# Patient Record
Sex: FEMALE | Race: WHITE
[De-identification: names, ages, dates, MRNs, and addresses within clinical notes are randomized per-mention and may not be internally consistent; named-entity substitution may affect disease eponyms.]

---

## 2017-01-24 ENCOUNTER — HOSPITAL ENCOUNTER (OUTPATIENT)
Age: 82
Discharge: HOME | End: 2017-01-24
Payer: COMMERCIAL

## 2017-12-31 ENCOUNTER — HOSPITAL ENCOUNTER (EMERGENCY)
Dept: HOSPITAL 76 - ED | Age: 82
Discharge: HOME | End: 2017-12-31
Payer: COMMERCIAL

## 2017-12-31 VITALS — SYSTOLIC BLOOD PRESSURE: 136 MMHG | DIASTOLIC BLOOD PRESSURE: 65 MMHG
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
## 2017-12-31 DIAGNOSIS — I10: ICD-10-CM
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
## 2017-12-31 DIAGNOSIS — S52.501A: Primary | ICD-10-CM

## 2017-12-31 PROCEDURE — 99284 EMERGENCY DEPT VISIT MOD MDM: CPT

## 2017-12-31 PROCEDURE — 29105 APPLICATION LONG ARM SPLINT: CPT

## 2017-12-31 PROCEDURE — 99283 EMERGENCY DEPT VISIT LOW MDM: CPT

## 2017-12-31 NOTE — ED PHYSICIAN DOCUMENTATION
PD HPI UPPER EXT INJURY





- Stated complaint


Stated Complaint: GLF/ARM INJURY





- Chief complaint


Chief Complaint: Trauma Ext





- History obtained from


History obtained from: Patient, Family





- History of Present Illness


Location: Right, Wrist


Type of injury: Fall


Where injury occurred: Home


Timing - onset: Yesterday


Timing - details: Abrupt onset


Improved by: Rest, Immobilization


Worsened by: Moving, Palpating


Associated symptoms: Swelling, Discolored.  No: Weakness, Numbness


Contributing factors: No: Anticoagulated, Prior ortho surgery


Recently seen: Not recently seen





- Additonal information


Additional information: 





patient is an 82 year old female presenting to the emergency department for 

wrist pain.  Patient states that she slipped last night and fell.  Patient put 

her arm out to break her fall and she hurt her wrist.  Patient denies any head 

trauma, loc or pain anywhere else.  





Review of Systems


Constitutional: reports: Reviewed and negative


Eyes: reports: Reviewed and negative


Ears: reports: Reviewed and negative


Nose: reports: Reviewed and negative


Throat: reports: Reviewed and negative


Cardiac: denies: Chest pain / pressure, Palpitations


Respiratory: denies: Dyspnea, Cough, Wheezing


GI: denies: Nausea, Vomiting


: reports: Reviewed and negative


Skin: denies: Rash, Lesions, Abrasion (s)


Musculoskeletal: reports: Extremity pain, Joint pain, Joint swelling


Neurologic: denies: Generalized weakness, Focal weakness, Numbness





PD PAST MEDICAL HISTORY





- Past Medical History


Cardiovascular: Hypertension


Respiratory: COPD


Neuro: Other


Psych: Depression


Other Past Medical History: memory loss





- Past Surgical History


General: Cholecystectomy


HEENT: Cataracts





- Present Medications


Home Medications: 


 Ambulatory Orders











 Medication  Instructions  Recorded  Confirmed


 


Oxycodone HCl/Acetaminophen 1 - 2 each PO Q6H PRN #7 tablet 12/31/17 





[Percocet 5-325 mg Tablet]   














- Allergies


Allergies/Adverse Reactions: 


 Allergies











Allergy/AdvReac Type Severity Reaction Status Date / Time


 


No Known Drug Allergies Allergy   Verified 12/31/17 06:29














- Social History


Does the pt smoke?: No


Smoking Status: Never smoker


Does the pt drink ETOH?: Yes


ETOH Use: Wine


Does the pt have substance abuse?: No





PD ED PE NORMAL





- Vitals


Vital signs reviewed: Yes





- General


General: Alert and oriented X 3, Well developed/nourished





- HEENT


HEENT: Atraumatic, PERRL





- Neck


Neck: No bony TTP





- Cardiac


Cardiac: RRR, No murmur





- Respiratory


Respiratory: No respiratory distress





- Abdomen


Abdomen: Non distended





- Neuro


Neuro: Alert and oriented X 3, No sensory deficit, Normal speech


Eye Opening: Spontaneous


Motor: Obeys Commands


Verbal: Oriented


GCS Score: 15





PD ED PE EXPANDED





- Extremities


Extremities: Right wrist (tenderness, ecchymosis and swelling of right wrist)





Results





- Vitals


Vitals: 


 Vital Signs - 24 hr











  12/31/17





  06:29


 


Temperature 36.5 C


 


Heart Rate 76


 


Respiratory 16





Rate 


 


Blood Pressure 136/65 H


 


O2 Saturation 97








 Oxygen











O2 Source                      Room air

















- Rads (name of study)


  ** right wrist


Radiology: Final report received (minimally displaced distal radial fx)





Procedures





- Splint (location)


  ** right wrist


Splint applied by: Tech


Type of splint: Sugar tong


Other: Patient tolerated well, No complications, Neurovascular intact, Good 

alignment





PD MEDICAL DECISION MAKING





- ED course


Complexity details: reviewed old records, reviewed results, re-evaluated patient

, considered differential, d/w patient, d/w family


ED course: 





Patient was seen and examined at bedside.  patient was sent for imaging.  when 

patient returned the results were reviewed.  there was a distal radial 

fracture.  Patient was placed in a sugar tong splint.  Patient tolerated it 

well and was stable for discharge with outpatient orthopedic follow up.  





Departure





- Departure


Disposition: 01 Home, Self Care


Clinical Impression: 


 Wrist fracture, right





Condition: Good


Instructions:  Distal Radius Fx


Follow-Up: 


Mendez Low MD [Provider Admit Priv/Credential] - Within 3 Days


Prescriptions: 


Oxycodone HCl/Acetaminophen [Percocet 5-325 mg Tablet] 1 - 2 each PO Q6H PRN #7 

tablet


 PRN Reason: pain


Comments: 


Your symptoms today are being caused by a wrist fracture.  You were placed in a 

splint but you will need to follow up with the orthopedic doctor for further 

care.  You should keep your wrist elevated and ice the wrist at least 4 times a 

day.  You should take tylenol for pain.  You should return to the emergency 

department for numbness, uncontrollable pain, new or worsening symptoms.  


Discharge Date/Time: 12/31/17 07:52

## 2017-12-31 NOTE — XRAY PRELIMINARY REPORT
Accession: [id number]

Exam: XR WRIST 3 VIEW RT

 

IMPRESSION: 

1. Fracture of the distal radial metaphysis extending to the distal radial articular surface. 

2. No significant angulation.

 

[facility]

 

SITE ID: [id number]

## 2017-12-31 NOTE — XRAY REPORT
EXAM:

RIGHT WRIST RADIOGRAPHY

 

EXAM DATE: 12/31/2017 06:43 AM.

 

CLINICAL HISTORY: Fall, wrist pain.

 

COMPARISON: None.

 

TECHNIQUE: 4 views.

 

FINDINGS: 

Bones: Osteopenia. Fracture of the distal radial metaphysis extending to the distal radial articular 
surface. No significant angulation. Metal plate and screws in the distal ulna.

 

Joints: No dislocation seen. 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

 

Soft Tissues: Soft tissue swelling.

 

IMPRESSION: 

1. Fracture of the distal radial metaphysis extending to the distal radial articular surface. 

2. No significant angulation.

 

[facility]

Referring Provider Line: [phone]

 

SITE ID: [id number]

## 2020-05-13 ENCOUNTER — HOSPITAL ENCOUNTER (EMERGENCY)
Dept: HOSPITAL 76 - ED | Age: 85
Discharge: HOME | End: 2020-05-13
Payer: MEDICARE

## 2020-05-13 VITALS — DIASTOLIC BLOOD PRESSURE: 68 MMHG | SYSTOLIC BLOOD PRESSURE: 105 MMHG

## 2020-05-13 DIAGNOSIS — I10: ICD-10-CM

## 2020-05-13 DIAGNOSIS — J44.9: Primary | ICD-10-CM

## 2020-05-13 LAB
ANION GAP SERPL CALCULATED.4IONS-SCNC: 8 MMOL/L (ref 6–13)
BASOPHILS NFR BLD AUTO: 0.1 10^3/UL (ref 0–0.1)
BASOPHILS NFR BLD AUTO: 0.6 %
BUN SERPL-MCNC: 19 MG/DL (ref 6–20)
CALCIUM UR-MCNC: 9.7 MG/DL (ref 8.5–10.3)
CHLORIDE SERPL-SCNC: 104 MMOL/L (ref 101–111)
CO2 SERPL-SCNC: 25 MMOL/L (ref 21–32)
CREAT SERPLBLD-SCNC: 0.8 MG/DL (ref 0.4–1)
EOSINOPHIL # BLD AUTO: 0.2 10^3/UL (ref 0–0.7)
EOSINOPHIL NFR BLD AUTO: 1.8 %
ERYTHROCYTE [DISTWIDTH] IN BLOOD BY AUTOMATED COUNT: 13.3 % (ref 12–15)
GLUCOSE SERPL-MCNC: 116 MG/DL (ref 70–100)
HGB UR QL STRIP: 13.3 G/DL (ref 12–16)
LYMPHOCYTES # SPEC AUTO: 1.7 10^3/UL (ref 1.5–3.5)
LYMPHOCYTES NFR BLD AUTO: 20 %
MCH RBC QN AUTO: 30.3 PG (ref 27–31)
MCHC RBC AUTO-ENTMCNC: 31.9 G/DL (ref 32–36)
MCV RBC AUTO: 95 FL (ref 81–99)
MONOCYTES # BLD AUTO: 1.7 10^3/UL (ref 0–1)
MONOCYTES NFR BLD AUTO: 19.4 %
NEUTROPHILS # BLD AUTO: 4.9 10^3/UL (ref 1.5–6.6)
NEUTROPHILS # SNV AUTO: 8.5 X10^3/UL (ref 4.8–10.8)
NEUTROPHILS NFR BLD AUTO: 57.8 %
PDW BLD AUTO: 10 FL (ref 7.9–10.8)
PLAT MORPH BLD: (no result)
PLATELET # BLD: 236 10^3/UL (ref 130–450)
PLATELET BLD QL SMEAR: (no result)
RBC MAR: 4.39 10^6/UL (ref 4.2–5.4)
RBC MORPH BLD: (no result)
SODIUM SERPLBLD-SCNC: 137 MMOL/L (ref 135–145)

## 2020-05-13 PROCEDURE — 36415 COLL VENOUS BLD VENIPUNCTURE: CPT

## 2020-05-13 PROCEDURE — 94664 DEMO&/EVAL PT USE INHALER: CPT

## 2020-05-13 PROCEDURE — 85025 COMPLETE CBC W/AUTO DIFF WBC: CPT

## 2020-05-13 PROCEDURE — 71045 X-RAY EXAM CHEST 1 VIEW: CPT

## 2020-05-13 PROCEDURE — 94640 AIRWAY INHALATION TREATMENT: CPT

## 2020-05-13 PROCEDURE — 80048 BASIC METABOLIC PNL TOTAL CA: CPT

## 2020-05-13 PROCEDURE — 99284 EMERGENCY DEPT VISIT MOD MDM: CPT

## 2020-05-13 RX ADMIN — ALBUTEROL SULFATE STA MG: 2.5 SOLUTION RESPIRATORY (INHALATION) at 15:53

## 2020-05-13 RX ADMIN — IPRATROPIUM BROMIDE AND ALBUTEROL SULFATE STA ML: 2.5; .5 SOLUTION RESPIRATORY (INHALATION) at 14:39

## 2020-05-13 NOTE — XRAY REPORT
Reason:  cough, COPD

Procedure Date:  05/13/2020   

Accession Number:  [id number] / [id number]                    

Procedure:  XR  - Chest 1 View X-Ray CPT Code:  25909

 

***Final Report***

 

 

FULL RESULT:

 

 

EXAM:

CHEST RADIOGRAPHY

 

EXAM DATE: 5/13/2020 03:07 PM.

 

CLINICAL HISTORY: Cough, COPD.

 

COMPARISON: XR RIBS UNILAT W/ PA CHEST MIN 3 VIEWS 07/26/2008 1:43 PM.

 

TECHNIQUE: 1 view.

 

FINDINGS:

Lungs/Pleura: No focal opacities evident. No pleural effusion. No 

pneumothorax.

 

Mediastinum: Within exam limitations, the cardiomediastinal contour is 

normal.

 

Other: None.

IMPRESSION: No acute consolidation is identified.

 

[facility]

## 2020-05-13 NOTE — ED PHYSICIAN DOCUMENTATION
History of Present Illness





- Stated complaint


Stated Complaint: SOA





- Chief complaint


Chief Complaint: Resp





- History obtained from


History obtained from: Patient





- History of Present Illness


Timing: Last night


Pain level max: 0


Pain level now: 0





- Additonal information


Additional information: 





84-year-old female with a history of emphysema presents to the emergency 

department with increasing dyspnea since last night.  No cough.  No fever.  No 

chest pain.  No abdominal pain.  No nausea.  No vomiting.  She states that she 

has an inhaler at home but does not  No recent travel.  No recent surgeries.  

Worse with exertion, better with rest.use it.  States she has not used it in 

several months.





Review of Systems


Ten Systems: 10 systems reviewed and negative


Constitutional: denies: Fever, Chills


Eyes: denies: Decreased vision


Ears: denies: Ear pain


Nose: denies: Rhinorrhea / runny nose, Congestion


Throat: denies: Sore throat


Cardiac: denies: Chest pain / pressure, Palpitations


Respiratory: reports: Dyspnea, Wheezing.  denies: Cough, Hemoptysis


GI: denies: Abdominal Pain, Nausea, Vomiting, Diarrhea


: denies: Dysuria


Skin: denies: Rash


Musculoskeletal: denies: Neck pain, Back pain


Neurologic: denies: Headache





PD PAST MEDICAL HISTORY





- Past Medical History


Cardiovascular: Hypertension


Respiratory: COPD


Psych: Depression





- Past Surgical History


General: Cholecystectomy


HEENT: Cataracts





- Present Medications


Home Medications: 


                                Ambulatory Orders











 Medication  Instructions  Recorded  Confirmed


 


Oxycodone HCl/Acetaminophen 1 - 2 each PO Q6H PRN #7 tablet 12/31/17 





[Percocet 5-325 mg Tablet]   


 


Albuterol Sulf [Ventolin Hfa 1 - 2 puffs INH Q4HR PRN #1 inhaler 05/13/20 





Inhaler]   


 


predniSONE [Prednisone] 20 mg PO DAILY #5 tablet 05/13/20 














- Allergies


Allergies/Adverse Reactions: 


                                    Allergies











Allergy/AdvReac Type Severity Reaction Status Date / Time


 


No Known Drug Allergies Allergy   Verified 12/31/17 06:29














- Social History


Does the pt smoke?: No


Smoking Status: Never smoker


Does the pt drink ETOH?: Yes


Does the pt have substance abuse?: No





PD ED PE NORMAL





- Vitals


Vital signs reviewed: Yes





- General


General: Alert and oriented X 3, No acute distress, Well developed/nourished





- HEENT


HEENT: PERRL, Moist mucous membranes





- Neck


Neck: Supple, no meningeal sign





- Cardiac


Cardiac: RRR, Strong equal pulses





- Respiratory


Respiratory: Other (tight breath sounds with wheezing.)





- Abdomen


Abdomen: Soft, Non tender, Non distended





- Derm


Derm: Warm and dry, No rash





- Extremities


Extremities: No edema, No calf tenderness / cord





- Neuro


Neuro: Alert and oriented X 3





- Psych


Psych: Normal mood, Normal affect





Results





- Vitals


Vitals: 


                               Vital Signs - 24 hr











  05/13/20 05/13/20 05/13/20





  14:14 14:39 14:46


 


Temperature 36.7 C  


 


Heart Rate 100 90 89


 


Respiratory 28 H 22 13





Rate   


 


Blood Pressure 151/85 H  168/60 H


 


O2 Saturation 94  97














  05/13/20 05/13/20 05/13/20





  15:16 15:53 16:00


 


Temperature   


 


Heart Rate 87 81 85


 


Respiratory 16 12 16





Rate   


 


Blood Pressure 116/61  122/65


 


O2 Saturation 98  98








                                     Oxygen











O2 Source                      Room air

















- Labs


Labs: 


                                Laboratory Tests











  05/13/20 05/13/20





  14:42 14:42


 


WBC  8.5 


 


RBC  4.39 


 


Hgb  13.3 


 


Hct  41.7 


 


MCV  95.0 


 


MCH  30.3 


 


MCHC  31.9 L 


 


RDW  13.3 


 


Plt Count  236 


 


MPV  10.0 


 


Neut # (Auto)  4.9 


 


Lymph # (Auto)  1.7 


 


Mono # (Auto)  1.7 H 


 


Eos # (Auto)  0.2 


 


Baso # (Auto)  0.1 


 


Absolute Nucleated RBC  0.00 


 


Nucleated RBC %  0.0 


 


Manual Slide Review  Indicated 


 


WBC Morphology  NORMAL APPEARANCE 


 


Platelet Estimate  NORMAL (130-450,000) 


 


Platelet Morphology  NORMAL APPEARANCE 


 


RBC Morph Micro Appear  NORMAL APPEARANCE 


 


Sodium   137


 


Potassium   3.7


 


Chloride   104


 


Carbon Dioxide   25


 


Anion Gap   8.0


 


BUN   19


 


Creatinine   0.8


 


Estimated GFR (MDRD)   69 L


 


Glucose   116 H


 


Calcium   9.7














- Rads (name of study)


  ** cxr


Radiology: Prelim report reviewed, EMP read contemporaneously, See rad report 

(no acute disease)





PD MEDICAL DECISION MAKING





- ED course


Complexity details: reviewed results, re-evaluated patient, considered 

differential, d/w patient


ED course: 





Patient feels much better after nebulizer treatments and steroids.  Appears to 

have a flare of her underlying emphysema.  We will prescribe a new inhaler for 

her as well as a short course of low-dose steroids.  No fevers.  No pneumonia.  

No evidence of acute coronary syndrome or pulmonary embolus.  Patient counseled 

regarding signs and symptoms for which I believe and urgent re-evaluation would 

be necessary. Patient with good understanding of and agreement to plan and is 

comfortable going home at this time





This document was made in part using voice recognition software. While efforts 

are made to proofread this document, sound alike and grammatical errors may 

occur.





Departure





- Departure


Disposition: 01 Home, Self Care


Clinical Impression: 


 Moderate COPD (chronic obstructive pulmonary disease)





Condition: Good


Instructions:  ED COPD Flare


Follow-Up: 


your,doctor in 1 week [Other]


Prescriptions: 


Albuterol Sulf [Ventolin Hfa Inhaler] 1 - 2 puffs INH Q4HR PRN #1 inhaler


 PRN Reason: Shortness Of Air/Wheezing


predniSONE [Prednisone] 20 mg PO DAILY #5 tablet


Comments: 


Use the inhaler as prescribed.  Return if you worsen.  Follow-up with your 

doctor for further care.

## 2020-11-10 ENCOUNTER — HOSPITAL ENCOUNTER (OUTPATIENT)
Dept: HOSPITAL 76 - EMS | Age: 85
End: 2020-11-10
Attending: SURGERY
Payer: COMMERCIAL

## 2020-11-10 DIAGNOSIS — R53.1: Primary | ICD-10-CM

## 2021-12-16 ENCOUNTER — HOSPITAL ENCOUNTER (EMERGENCY)
Dept: HOSPITAL 76 - ED | Age: 86
Discharge: HOME | End: 2021-12-16
Payer: MEDICARE

## 2021-12-16 ENCOUNTER — HOSPITAL ENCOUNTER (OUTPATIENT)
Dept: HOSPITAL 76 - EMS | Age: 86
Discharge: ANOTHER HOSPITAL | End: 2021-12-16
Payer: MEDICARE

## 2021-12-16 VITALS — DIASTOLIC BLOOD PRESSURE: 99 MMHG | SYSTOLIC BLOOD PRESSURE: 165 MMHG

## 2021-12-16 DIAGNOSIS — F03.90: ICD-10-CM
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
## 2021-12-16 DIAGNOSIS — R07.81: ICD-10-CM

## 2021-12-16 DIAGNOSIS — Z04.3: Primary | ICD-10-CM

## 2021-12-16 DIAGNOSIS — I10: ICD-10-CM

## 2021-12-16 DIAGNOSIS — J44.9: ICD-10-CM

## 2021-12-16 DIAGNOSIS — S22.060A: Primary | ICD-10-CM

## 2021-12-16 PROCEDURE — 71250 CT THORAX DX C-: CPT

## 2021-12-16 PROCEDURE — 99284 EMERGENCY DEPT VISIT MOD MDM: CPT

## 2021-12-16 PROCEDURE — 94640 AIRWAY INHALATION TREATMENT: CPT

## 2021-12-16 NOTE — ED PHYSICIAN DOCUMENTATION
PD HPI Fall





- Stated complaint


Stated Complaint: GLF/RIB PX





- History obtained from


History obtained from: Patient





- History of Present Illness


Mechanism of injury: Lost balance


Fall distance: Standing position


Where injury occurred: Home ( did not see the fall, but came into room to

find her on floor. Compalined of pain upper back. No headache. She was 

conversant when he came in.)


Timing - onset: How many minutes ago (30), Today


Injury(ies) location: Chest (some chest pain posterolaterally on right.), Back. 

No: Head, Face, Neck


Quality of pain: Pain, Aching


Associated symptoms: No: LOC, AMS, Weakness, Paresthesias


Worsens with: Movement


Contributing factors: No: Anticoagulated, Intoxicated


Similar symptoms before: Diagnosis (prior fall several years or so ago with 

several rib fractures bilaterally and PTX.)





Review of Systems


Constitutional: denies: Fever, Chills


Nose: denies: Rhinorrhea / runny nose, Congestion


Throat: denies: Sore throat


Respiratory: denies: Cough


GI: denies: Abdominal Pain, Nausea, Vomiting, Diarrhea


Skin: denies: Abrasion (s), Laceration (s)


Musculoskeletal: reports: Back pain


Neurologic: denies: Focal weakness, Numbness, Headache, Head injury





PD PAST MEDICAL HISTORY





- Past Medical History


Cardiovascular: Hypertension


Respiratory: COPD


Neuro: Dementia


Endocrine/Autoimmune: None


GI: None


GYN: None


: None


HEENT: None


Psych: Depression


Musculoskeletal: None


Derm: None





- Past Surgical History


Past Surgical History: Yes


General: Cholecystectomy


HEENT: Cataracts





- Present Medications


Home Medications: 


                                Ambulatory Orders











 Medication  Instructions  Recorded  Confirmed


 


Albuterol Sulf [Ventolin Hfa 1 - 2 puffs INH Q4HR PRN #1 inhaler 05/13/20 





Inhaler]   


 


Albuterol Sulfate [Proair Hfa  06/12/20 





Inhaler]   


 


Alendronate [Fosamax] 70 mg PO ONCE 06/12/20 06/12/20


 


Amlodipine Besylate [Norvasc] mg PO 06/12/20 


 


Aspirin 81 mg PO 06/12/20 


 


Atorvastatin [Lipitor] mg 06/12/20 


 


Fluticasone 44 Mcg [Flovent] 1 puffs INH BID 06/12/20 06/12/20


 


Fluticasone 44 Mcg [Flovent] 1 puffs INH BID #1 inhaler 06/12/20 


 


Lisinopril [Zestril] mg PO 06/12/20 


 


Omeprazole mg PO 06/12/20 


 


Sertraline [Zoloft] mg PO DAILY 06/12/20 


 


Terazosin [Hytrin] 5 mg PO DAILY 06/12/20 06/12/20


 


Calcitonin [Fortical] 1 sprays ALIYAH DAILY 30 Days #1 12/16/21 





 bottle  


 


Naproxen 250 mg PO BID 10 Days #20 tablet 12/16/21 


 


oxyCODONE [Roxicodone] 5 mg PO Q6H PRN #14 tablet 12/16/21 














- Allergies


Allergies/Adverse Reactions: 


                                    Allergies











Allergy/AdvReac Type Severity Reaction Status Date / Time


 


No Known Drug Allergies Allergy   Verified 12/16/21 09:22














- Social History


Does the pt smoke?: No


Smoking Status: Never smoker


Does the pt drink ETOH?: Yes


Does the pt have substance abuse?: No





PD ED PE NORMAL





- Vitals


Vital signs reviewed: Yes





- General


General: Alert and oriented X 3, No acute distress, Well developed/nourished





- HEENT


HEENT: Atraumatic, Pharynx benign





- Neck


Neck: Supple, no meningeal sign, No adenopathy





- Cardiac


Cardiac: RRR, No murmur





- Respiratory


Respiratory: Clear bilaterally, Other (not tender in ribs per se. Has tenderness

 to percussion in mid thoracic area. Able to sit up on her own though. )





- Abdomen


Abdomen: Normal bowel sounds, Soft, Non tender, Non distended, No organomegaly





- Female 


Female : Deferred





- Rectal


Rectal: Deferred





Results





- Vitals


Vitals: 


                               Vital Signs - 24 hr











  12/16/21 12/16/21 12/16/21





  09:18 09:45 11:22


 


Temperature 37.3 C  36.7 C


 


Heart Rate 86 86 94


 


Respiratory 20 16 16





Rate   


 


Blood Pressure 156/54 H  165/99 H


 


O2 Saturation 100  99








                                     Oxygen











O2 Source                      Nasal cannula

















- Rads (name of study)


  ** chest CT


Radiology: Prelim report reviewed (prior healed rib fractures bilaterally. 

Possible hairline fractures right side in 2 ribs. T7 complression fracture with 

40% loss of height. Posterior space is good. ), See rad report





PD MEDICAL DECISION MAKING





- ED course


Complexity details: reviewed results, re-evaluated patient ( present on 

repeat exam. I discussed findings and treatment with them regarding meds and 

activities. ), considered differential (back pain and new finding of T7 

compression deformity compared to prior CT. Patient seems to be able to sit up 

reasonably comfortably. ), d/w patient





Departure





- Departure


Disposition: 01 Home, Self Care


Clinical Impression: 


Fall from slip, trip, or stumble


Qualifiers:


 Encounter type: initial encounter Qualified Code(s): W01.0XXA - Fall on same 

level from slipping, tripping and stumbling without subsequent striking against 

object, initial encounter





Thoracic compression fracture


Qualifiers:


 Encounter type: initial encounter Thoracic vertebra fracture level: T7 

Qualified Code(s): S22.060A - Wedge compression fracture of T7-T8 vertebra, 

initial encounter for closed fracture





Condition: Stable


Record reviewed to determine appropriate education?: Yes


Instructions:  ED Fx Comp Vertebral


Prescriptions: 


Calcitonin [Fortical] 1 sprays ALIYAH DAILY 30 Days #1 bottle


Naproxen 250 mg PO BID 10 Days #20 tablet


oxyCODONE [Roxicodone] 5 mg PO Q6H PRN #14 tablet


 PRN Reason: Pain


Comments: 


There was a question of old versus new rib fracture on the right.  There appears

 to be a new compression fracture at the seventh thoracic vertebrae in the 

picture as I showed you.





This will hurt with movement and getting up and around.  Activity as tolerated.





Use an anti-inflammatory such as naproxen twice daily with food for the next 7 

to 10 days.  To that add Tylenol 325 mg 4 times a day regularly for the next 

several days to week.  Add oxycodone every 6-8 hours if needed for worse pain.





Also use the calcitonin nasal spray daily for the next 4 weeks to help improve 

the healing of the bone a little faster.





This should improve fairly well over the first week to week and a half but take 

about a month to heal up.





Follow-up with your primary care in about a week, call for an appointment.





I sent your prescriptions to [facility] in [place].





I am prescribing a short course of narcotic pain medication for you.  These are 

potentially dangerous and addictive medications that should be used carefully.


These medications may constipate you.  Take an over-the-counter stool softener 

such as docusate twice daily with plenty of water while taking these 

medications.  If you go 24 hours without a bowel movement, take over-the-counter

 MiraLAX, per package instructions.


Do not drink or drive while taking these medications.


If you received narcotic or sedating medications while in the emergency 

department do not drive for 24 hours.  Store this medication in a safe, secure 

place and out of reach of children.


It is a violation of federal law to give or sell this medication to another 

person or to use in a manner other than prescribed.


The ED will not refill narcotic prescriptions, including prescriptions lost or 

stolen.  You can dispose of unwanted medications at the [facility]'s office 

or at several pharmacies such as [facility] and [facility].


Discharge Date/Time: 12/16/21 12:00

## 2021-12-16 NOTE — CT REPORT
PROCEDURE:  CHEST WO

 

INDICATIONS:  fall with pain mid thoracic and left ribs

 

TECHNIQUE: 

Noncontrast 1mm axial images were acquired from the pulmonary apices to the posterior costophrenic an
gles.  Axial 5 mm soft tissue kernel reconstructions were performed as well as 8 mm axial MIP and cor
onal and sagittal 5 mm reformations. For radiation dose reduction, the following was used: automate
d exposure control, adjustment of mA and/or kV according to patient size.

 

COMPARISON:  CT pulmonary angiogram 9/4/2020.

 

FINDINGS:  

Image quality:  Excellent.  

 

Lungs and pleura:  No acute air space opacities.  Chronic bandlike scarring or atelectasis is seen al
izaiah the left major fissure. There is mild atelectasis in the lung bases. No pleural effusions or pneu
mothorax.  Central and peripheral airways are patent and normal in caliber.  

 

Mediastinum:  Heart size is normal.  No pericardial effusion.  No mediastinal adenopathy by size crit
eria.  Thoracic aorta and central pulmonary arteries are normal in size. Mild aortic atherosclerotic 
calcifications. Esophagus is normal in caliber.  No hiatal hernia.  

 

Bones and chest wall:  Chronic left-sided fracture deformities are redemonstrated. No acute left-side
d rib fracture is seen. Suspected minimally displaced fractures of the posterior right 3rd through 5t
h ribs. Healed right-sided fractures are seen in the posterior 2nd rib and the lateral portion of the
 right 9th and 10th ribs. Chronic mild superior endplate depression of the T3 and L1 and L2 vertebral
 bodies and possibly T1 and T2. Mild compression fracture of the T7 vertebral body appears new when c
ompared to the CT from 9/4/2020. No suspicious bony lesions.   No axillary or supraclavicular adenopa
thy by size criteria.  The thyroid is normal in size and there are no incidental findings.

 

Abdomen:  Status post cholecystectomy. An 8 mm nonobstructing calculus is seen in the included left k
idney. Visualized upper abdominal solid organs and bowel loops otherwise appear normal in the absence
 of contrast.  

 

IMPRESSION:  

1.Suspected subtle nondisplaced fractures of the posterior right third through fifth ribs. Recommend 
correlation with point tenderness. Additional healed rib fractures are seen bilaterally.

 

2.Moderate T7 compression fracture is of indeterminate age but appears new when compared to the CT fr
om 9/4/2020. Additional chronic compression fractures appear unchanged.

 

3.Nonobstructing 8 mm left renal calculus.

 

 

Reviewed by: Sebas Minaya MD on 12/16/2021 10:51 AM PST

Approved by: Sebas Minaya MD on 12/16/2021 10:51 AM PST

 

 

Station ID:  [id number]

## 2021-12-26 ENCOUNTER — HOSPITAL ENCOUNTER (OUTPATIENT)
Dept: HOSPITAL 76 - EMS | Age: 86
End: 2021-12-26
Payer: MEDICARE

## 2021-12-26 DIAGNOSIS — M54.50: Primary | ICD-10-CM

## 2022-01-06 ENCOUNTER — HOSPITAL ENCOUNTER (OUTPATIENT)
Dept: HOSPITAL 76 - DI.S | Age: 87
Discharge: HOME | End: 2022-01-06
Attending: PHYSICIAN ASSISTANT
Payer: MEDICARE

## 2022-01-06 DIAGNOSIS — R93.6: Primary | ICD-10-CM

## 2022-01-06 DIAGNOSIS — R93.89: ICD-10-CM

## 2022-01-06 NOTE — XRAY REPORT
PROCEDURE:  Ankle 3 View RT

 

INDICATIONS:  RIGHT ANKLE PAIN/FALL

 

TECHNIQUE:  3 views of the ankle were acquired.  

 

COMPARISON:  None

 

FINDINGS:  

 

Bones:  No fractures or dislocations.  Ankle mortise is normally aligned.  No suspicious bony lesions
.  Calcaneal plantar spur is noted.

 

Soft tissues:  There is soft tissue swelling over the medial and lateral malleolus. No tibiotalar wenceslao
nt effusion.  Achilles tendon appears normal.  

 

IMPRESSION:  

1. No fracture.

2. Soft tissue swelling over the medial and lateral malleolus suggests possible ligamentous injury.

 

Reviewed by: Luis Carlos Navas on 1/6/2022 5:22 PM PST

Approved by: Luis Carlos Navas on 1/6/2022 5:22 PM [facility]

 

 

Station ID:  [id number]

## 2022-06-06 ENCOUNTER — HOSPITAL ENCOUNTER (OUTPATIENT)
Dept: HOSPITAL 76 - EMS | Age: 87
Discharge: HOME | End: 2022-06-06
Payer: MEDICARE

## 2022-06-06 DIAGNOSIS — Z53.9: Primary | ICD-10-CM

## 2022-10-31 ENCOUNTER — HOSPITAL ENCOUNTER (OUTPATIENT)
Dept: HOSPITAL 76 - EMS | Age: 87
End: 2022-10-31
Payer: MEDICARE

## 2022-10-31 DIAGNOSIS — Z03.89: Primary | ICD-10-CM

## 2023-03-18 ENCOUNTER — HOSPITAL ENCOUNTER (OUTPATIENT)
Dept: HOSPITAL 76 - EMS | Age: 88
End: 2023-03-18
Payer: MEDICARE
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
## 2023-03-18 DIAGNOSIS — M25.511: Primary | ICD-10-CM

## 2023-05-14 ENCOUNTER — HOSPITAL ENCOUNTER (OUTPATIENT)
Dept: HOSPITAL 76 - EMS | Age: 88
End: 2023-05-14
Payer: MEDICARE

## 2023-05-14 DIAGNOSIS — R53.1: Primary | ICD-10-CM
